# Patient Record
Sex: FEMALE | Race: WHITE | NOT HISPANIC OR LATINO
[De-identification: names, ages, dates, MRNs, and addresses within clinical notes are randomized per-mention and may not be internally consistent; named-entity substitution may affect disease eponyms.]

---

## 2019-01-30 PROBLEM — Z00.00 ENCOUNTER FOR PREVENTIVE HEALTH EXAMINATION: Status: ACTIVE | Noted: 2019-01-30

## 2019-02-11 ENCOUNTER — APPOINTMENT (OUTPATIENT)
Dept: PULMONOLOGY | Facility: CLINIC | Age: 54
End: 2019-02-11
Payer: COMMERCIAL

## 2019-02-11 VITALS — DIASTOLIC BLOOD PRESSURE: 70 MMHG | SYSTOLIC BLOOD PRESSURE: 120 MMHG | HEART RATE: 91 BPM | OXYGEN SATURATION: 98 %

## 2019-02-11 VITALS — RESPIRATION RATE: 16 BRPM

## 2019-02-11 VITALS — HEIGHT: 69.5 IN | WEIGHT: 104 LBS | BODY MASS INDEX: 15.06 KG/M2

## 2019-02-11 VITALS — BODY MASS INDEX: 20.65 KG/M2 | HEIGHT: 59.5 IN

## 2019-02-11 PROCEDURE — 94664 DEMO&/EVAL PT USE INHALER: CPT | Mod: 59

## 2019-02-11 PROCEDURE — 99205 OFFICE O/P NEW HI 60 MIN: CPT | Mod: 25

## 2019-02-11 PROCEDURE — 94060 EVALUATION OF WHEEZING: CPT

## 2019-02-11 NOTE — PROCEDURE
[FreeTextEntry1] : Spirometry shows severe restrictive disease with FEV1 30% predicted and vital capacity 30% predicted. There is no bronchodilator response. In terms of absolute numbers, there is about a 15% drop over what was seen 20 years ago.

## 2019-02-11 NOTE — ASSESSMENT
[FreeTextEntry1] : The patient has long-standing kyphoscoliosis with severe restrictive disease. She is having more shortness of breath. There may be some loss of height here or some mild weight gain. She has signs of obstructive sleep apnea but I'm also concerned that she may have been related hypoventilation.\par \par As far as her lung function goes, there is little that we can do at this time. I have ordered a baseline ABG to see if there is CO2 retention.\par \par A sleep study has been ordered with end-tidal CO2 monitoring. If there is hypoventilation and she may benefit from noninvasive positive pressure ventilation at night with trilogy in the AVAPS AE mode.

## 2019-02-11 NOTE — PHYSICAL EXAM
[Well Groomed] : well groomed [General Appearance - Well Nourished] : well nourished [General Appearance - In No Acute Distress] : no acute distress [Low Lying Soft Palate] : low lying soft palate [Elongated Uvula] : elongated uvula [Micrognathia] : micrognathia [III] : III [Neck Appearance] : the appearance of the neck was normal [Neck Cervical Mass (___cm)] : no neck mass was observed [Jugular Venous Distention Increased] : there was no jugular-venous distention [Thyroid Diffuse Enlargement] : the thyroid was not enlarged [Thyroid Nodule] : there were no palpable thyroid nodules [Heart Rate And Rhythm] : heart rate and rhythm were normal [Heart Sounds] : normal S1 and S2 [Murmurs] : no murmurs present [Respiration, Rhythm And Depth] : normal respiratory rhythm and effort [Exaggerated Use Of Accessory Muscles For Inspiration] : no accessory muscle use [Auscultation Breath Sounds / Voice Sounds] : lungs were clear to auscultation bilaterally [Chest Palpation] : palpation of the chest revealed no abnormalities [Lungs Percussion] : the lungs were normal to percussion [Kyphosis] : kyphosis [Scoliosis] : scoliosis [Abdomen Soft] : soft [Abdomen Tenderness] : non-tender [Abdomen Mass (___ Cm)] : no abdominal mass palpated [Abnormal Walk] : normal gait [Gait - Sufficient For Exercise Testing] : the gait was sufficient for exercise testing [Nail Clubbing] : no clubbing of the fingernails [Cyanosis, Localized] : no localized cyanosis [Petechial Hemorrhages (___cm)] : no petechial hemorrhages [Skin Color & Pigmentation] : normal skin color and pigmentation [Skin Turgor] : normal skin turgor [] : no rash [Deep Tendon Reflexes (DTR)] : deep tendon reflexes were 2+ and symmetric [Sensation] : the sensory exam was normal to light touch and pinprick [No Focal Deficits] : no focal deficits [Oriented To Time, Place, And Person] : oriented to person, place, and time [Impaired Insight] : insight and judgment were intact [Affect] : the affect was normal [FreeTextEntry1] : severe kyphoscoliosis

## 2019-02-18 ENCOUNTER — OUTPATIENT (OUTPATIENT)
Dept: OUTPATIENT SERVICES | Facility: HOSPITAL | Age: 54
LOS: 1 days | End: 2019-02-18
Payer: COMMERCIAL

## 2019-02-18 DIAGNOSIS — J98.4 OTHER DISORDERS OF LUNG: ICD-10-CM

## 2019-02-18 LAB
BASE EXCESS BLDA CALC-SCNC: 3.5 MMOL/L — HIGH (ref -2–2)
BLOOD GAS COMMENTS ARTERIAL: SIGNIFICANT CHANGE UP
GAS PNL BLDA: SIGNIFICANT CHANGE UP
HCO3 BLDA-SCNC: 28 MMOL/L — HIGH (ref 20–26)
HOROWITZ INDEX BLDA+IHG-RTO: 0.21 — SIGNIFICANT CHANGE UP
PCO2 BLDA: 43 MMHG — SIGNIFICANT CHANGE UP (ref 35–45)
PH BLDA: 7.43 — SIGNIFICANT CHANGE UP (ref 7.35–7.45)
PO2 BLDA: 92 MMHG — SIGNIFICANT CHANGE UP (ref 83–108)
SAO2 % BLDA: 98 % — SIGNIFICANT CHANGE UP (ref 95–99)

## 2019-02-18 PROCEDURE — 82803 BLOOD GASES ANY COMBINATION: CPT

## 2019-03-26 ENCOUNTER — APPOINTMENT (OUTPATIENT)
Dept: PULMONOLOGY | Facility: CLINIC | Age: 54
End: 2019-03-26
Payer: COMMERCIAL

## 2019-03-26 VITALS
SYSTOLIC BLOOD PRESSURE: 100 MMHG | WEIGHT: 106 LBS | OXYGEN SATURATION: 94 % | HEART RATE: 86 BPM | BODY MASS INDEX: 21.05 KG/M2 | DIASTOLIC BLOOD PRESSURE: 60 MMHG

## 2019-03-26 VITALS — RESPIRATION RATE: 16 BRPM

## 2019-03-26 PROCEDURE — 99214 OFFICE O/P EST MOD 30 MIN: CPT

## 2019-03-26 NOTE — ASSESSMENT
[FreeTextEntry1] : The patient demonstrates no CO2 retention. She has moderate obstructive sleep apnea. Based upon her body habitus and the results of her blood gas and sleep study, I believe the best course of treatment here would be oral appliance therapy. I have referred her for that and I will see her back for a home sleep study with the appliance in once it is fitted.

## 2019-03-26 NOTE — REASON FOR VISIT
[Follow-Up] : a follow-up visit [Shortness of Breath] : shortness of Breath [Sleep Apnea] : sleep apnea [Spouse] : spouse

## 2019-03-26 NOTE — PHYSICAL EXAM
[Well Groomed] : well groomed [General Appearance - Well Nourished] : well nourished [General Appearance - In No Acute Distress] : no acute distress [FreeTextEntry1] : severe kyphoscoliosis [Low Lying Soft Palate] : low lying soft palate [Elongated Uvula] : elongated uvula [Micrognathia] : micrognathia [III] : III [Neck Appearance] : the appearance of the neck was normal [Neck Cervical Mass (___cm)] : no neck mass was observed [Jugular Venous Distention Increased] : there was no jugular-venous distention [Thyroid Diffuse Enlargement] : the thyroid was not enlarged [Thyroid Nodule] : there were no palpable thyroid nodules [Heart Rate And Rhythm] : heart rate and rhythm were normal [Heart Sounds] : normal S1 and S2 [Murmurs] : no murmurs present [Respiration, Rhythm And Depth] : normal respiratory rhythm and effort [Exaggerated Use Of Accessory Muscles For Inspiration] : no accessory muscle use [Auscultation Breath Sounds / Voice Sounds] : lungs were clear to auscultation bilaterally [Chest Palpation] : palpation of the chest revealed no abnormalities [Lungs Percussion] : the lungs were normal to percussion [Kyphosis] : kyphosis [Scoliosis] : scoliosis [Abdomen Soft] : soft [Abdomen Tenderness] : non-tender [Abdomen Mass (___ Cm)] : no abdominal mass palpated [Abnormal Walk] : normal gait [Gait - Sufficient For Exercise Testing] : the gait was sufficient for exercise testing [Nail Clubbing] : no clubbing of the fingernails [Cyanosis, Localized] : no localized cyanosis [Petechial Hemorrhages (___cm)] : no petechial hemorrhages [Deep Tendon Reflexes (DTR)] : deep tendon reflexes were 2+ and symmetric [Sensation] : the sensory exam was normal to light touch and pinprick [No Focal Deficits] : no focal deficits [Oriented To Time, Place, And Person] : oriented to person, place, and time [Impaired Insight] : insight and judgment were intact [Affect] : the affect was normal [Skin Color & Pigmentation] : normal skin color and pigmentation [Skin Turgor] : normal skin turgor [] : no rash

## 2024-01-29 ENCOUNTER — APPOINTMENT (OUTPATIENT)
Dept: CARDIOLOGY | Facility: CLINIC | Age: 59
End: 2024-01-29
Payer: COMMERCIAL

## 2024-01-29 VITALS
DIASTOLIC BLOOD PRESSURE: 80 MMHG | BODY MASS INDEX: 23.3 KG/M2 | RESPIRATION RATE: 16 BRPM | HEART RATE: 80 BPM | OXYGEN SATURATION: 97 % | HEIGHT: 57 IN | SYSTOLIC BLOOD PRESSURE: 142 MMHG | WEIGHT: 108 LBS

## 2024-01-29 DIAGNOSIS — G47.33 OBSTRUCTIVE SLEEP APNEA (ADULT) (PEDIATRIC): ICD-10-CM

## 2024-01-29 PROCEDURE — 93000 ELECTROCARDIOGRAM COMPLETE: CPT

## 2024-01-29 PROCEDURE — 99204 OFFICE O/P NEW MOD 45 MIN: CPT | Mod: 25

## 2024-01-29 RX ORDER — ISOPROPYL ALCOHOL 700 MG/ML
LIQUID TOPICAL
Refills: 0 | Status: DISCONTINUED | COMMUNITY
End: 2024-01-29

## 2024-01-29 RX ORDER — CHROMIUM 200 MCG
TABLET ORAL
Refills: 0 | Status: ACTIVE | COMMUNITY

## 2024-01-29 RX ORDER — RISEDRONATE SODIUM 150 MG/1
150 TABLET, FILM COATED ORAL
Refills: 0 | Status: ACTIVE | COMMUNITY

## 2024-01-29 RX ORDER — FLUTICASONE PROPIONATE 50 UG/1
50 SPRAY, METERED NASAL
Refills: 0 | Status: DISCONTINUED | COMMUNITY
Start: 2019-03-26 | End: 2024-01-29

## 2024-01-29 NOTE — ASSESSMENT
[FreeTextEntry1] : ECG: Normal sinus rhythm at 80 bpm.  No significant ST or T wave changes.  Impression: 58-year-old female 14 years postmenopausal with 5 months of exertional chest tightness. Longstanding severe kyphoscoliosis and restrictive lung disease which could possibly contribute to some of the symptoms. No obvious risk factors for coronary artery disease.  Plan: 1.  Echocardiography to rule out any significant structural heart disease or pulm hypertension that may contribute to symptoms.  2. An exercise sestamibi stress test looking for evidence of ischemia given the rather convincing story for coronary insufficiency  3.  Pulmonary follow-up given the history of severe kyphoscoliosis and restrictive lung disease which may very well contribute to some of the symptoms.

## 2024-01-29 NOTE — REASON FOR VISIT
[FreeTextEntry1] : 58-year-old female with a history of surgical menopause 14 years ago presents here for cardiac evaluation of exertional chest tightness.  Patient describes approximately 5 months of symptoms, worse in the cold weather that are best described as a tightness that is retrosternal lasting a few minutes occurring exclusively with exertion.  Associated symptoms can occur nausea and diaphoresis. She has some generalized exertional fatigue.  Symptoms have not become any worse but she had a particularly bad episode about 10 days ago while walking in the cold air in [facility].  No pre-existing history of cardiovascular disease Denies hypertension, diabetes, hyperlipidemia tobacco use or family history of premature CAD.  There is some history of valvular disease in the family  Patient has a longstanding history of severe kyphoscoliosis with secondary restrictive lung disease last assessed by pulmonary more than 4 years ago. Patient does have a history of moderate obstructive sleep apnea with an AHI of 18.6

## 2024-01-29 NOTE — REVIEW OF SYSTEMS
[Dyspnea on exertion] : dyspnea during exertion [Chest Discomfort] : chest discomfort [Lower Ext Edema] : no extremity edema [Leg Claudication] : no intermittent leg claudication [Palpitations] : no palpitations [PND] : no PND [Syncope] : no syncope [Joint Pain] : no joint pain [Joint Stiffness] : joint stiffness [Negative] : Heme/Lymph

## 2024-01-29 NOTE — PHYSICAL EXAM
[Clear Lung Fields] : clear lung fields [No Respiratory Distress] : no respiratory distress  [No Edema] : no edema [No Cyanosis] : no cyanosis [No Rash] : no rash [No Skin Lesions] : no skin lesions [Normal] : alert and oriented, normal memory [de-identified] : Severe kyphoscoliosis [de-identified] : Normocephalic atraumatic [de-identified] : Cardiac: Nl S1 S2 with a grade 1/6  FILI and no significant  gallops or rubs. [de-identified] : Right upper extremity is atrophic and has muscle wasting with fixation of the joints.

## 2024-02-05 ENCOUNTER — APPOINTMENT (OUTPATIENT)
Dept: CARDIOLOGY | Facility: CLINIC | Age: 59
End: 2024-02-05
Payer: COMMERCIAL

## 2024-02-05 PROCEDURE — 93306 TTE W/DOPPLER COMPLETE: CPT

## 2024-02-15 ENCOUNTER — NON-APPOINTMENT (OUTPATIENT)
Age: 59
End: 2024-02-15

## 2024-02-15 ENCOUNTER — APPOINTMENT (OUTPATIENT)
Dept: FAMILY MEDICINE | Facility: CLINIC | Age: 59
End: 2024-02-15
Payer: COMMERCIAL

## 2024-02-15 VITALS
DIASTOLIC BLOOD PRESSURE: 73 MMHG | SYSTOLIC BLOOD PRESSURE: 111 MMHG | BODY MASS INDEX: 22.87 KG/M2 | HEART RATE: 96 BPM | WEIGHT: 106 LBS | OXYGEN SATURATION: 97 % | HEIGHT: 57 IN | TEMPERATURE: 97.7 F

## 2024-02-15 PROCEDURE — 99204 OFFICE O/P NEW MOD 45 MIN: CPT

## 2024-02-15 NOTE — HISTORY OF PRESENT ILLNESS
[FreeTextEntry1] : new  [de-identified] :  2 kids  1 gc  tob -    etoh -     stay at [facility]  vit  d   low   osteoporosis    r monthly

## 2024-02-15 NOTE — HEALTH RISK ASSESSMENT
[0] : 2) Feeling down, depressed, or hopeless: Not at all (0) [PHQ-2 Negative - No further assessment needed] : PHQ-2 Negative - No further assessment needed [[id number]] : 0

## 2024-02-15 NOTE — PLAN
[FreeTextEntry1] : 58-year-old female establishes medical care at this facility Accompanied by her , well-developed well-nourished female no acute distress Non-smoker/nondrinker  2 children 1 grandchild She stays at home to care for her grandchild Biliary colic/viral gastroenteritis-seen in the emergency room yesterday for nausea vomiting.  Evidence of gallstones nonobstructive Review of lab work unremarkable Renal cysts-incidental finding of renal cysts noncontributory normal renal function Kyphoscoliosis-childhood condition with secondary hypoventilation syndrome and obstructive sleep apnea Osteoporosis-right spin around once monthly Will return for lab work and evaluation

## 2024-02-20 ENCOUNTER — APPOINTMENT (OUTPATIENT)
Dept: PULMONOLOGY | Facility: CLINIC | Age: 59
End: 2024-02-20
Payer: COMMERCIAL

## 2024-02-20 VITALS
BODY MASS INDEX: 22.87 KG/M2 | RESPIRATION RATE: 16 BRPM | HEART RATE: 81 BPM | SYSTOLIC BLOOD PRESSURE: 140 MMHG | OXYGEN SATURATION: 97 % | WEIGHT: 106 LBS | DIASTOLIC BLOOD PRESSURE: 72 MMHG | HEIGHT: 57 IN

## 2024-02-20 PROCEDURE — G2211 COMPLEX E/M VISIT ADD ON: CPT

## 2024-02-20 PROCEDURE — 99205 OFFICE O/P NEW HI 60 MIN: CPT

## 2024-02-20 NOTE — DISCUSSION/SUMMARY
[FreeTextEntry1] : 58-year-old female seen today for the above.  Patient's complaints of dyspnea may be on the basis of age and worsening effects of her chest wall restriction which were in the severe category 5 years ago.  Her untreated sleep apnea may be contributing to tach arrhythmias during the day and therefore she is to be reevaluated with home sleep study as well as full pulmonary function test.  Further recommendations to follow.  Patient will complete her cardiac workup during this time also

## 2024-02-20 NOTE — PHYSICAL EXAM
[No Acute Distress] : no acute distress [Normal Oropharynx] : normal oropharynx [IV] : Mallampati Class: IV [Normal Appearance] : normal appearance [Neck Circumference: ___] : neck circumference: [unfilled] [No Neck Mass] : no neck mass [Normal Rate/Rhythm] : normal rate/rhythm [Normal S1, S2] : normal s1, s2 [No Murmurs] : no murmurs [No Resp Distress] : no resp distress [Clear to Auscultation Bilaterally] : clear to auscultation bilaterally [No Abnormalities] : no abnormalities [Scoliosis] : scoliosis [Surgical scars] : surgical scars [Benign] : benign [Normal Gait] : normal gait [No Clubbing] : no clubbing [No Cyanosis] : no cyanosis [No Edema] : no edema [FROM] : FROM [Normal Color/ Pigmentation] : normal color/ pigmentation [No Focal Deficits] : no focal deficits [Oriented x3] : oriented x3 [Normal Affect] : normal affect

## 2024-02-20 NOTE — HISTORY OF PRESENT ILLNESS
[Obstructive Sleep Apnea] : obstructive sleep apnea [Awakes Unrefreshed] : awakes unrefreshed [Awakes with Dry Mouth] : awakes with dry mouth [Awakes with Headache] : awakes with headache [Daytime Somnolence] : daytime somnolence [Snoring] : snoring [Never] : never [TextBox_4] : 58-year-old female with a history of severe scoliosis and chest wall restriction with last documented PFTs in this office over 5 years ago of 30% of predicted.  The patient was seen by cardiology and was noted to have worsening fatigue and most recently had increased symptoms with documented tachycardia while walking in the city.  She denies any symptoms of cough, wheeze, sputum, fevers, chills, chest pains.  No history of leg edema, paroxysmal nocturnal dyspnea or orthopnea.  Her sleep history as below but the patient was originally considering an oral appliance which she failed to follow-up with.  Patient was seen for [facility] several weeks ago for nausea vomiting and diarrhea which resolved.  Workup there showed no evidence of chronic hypercapnia on routine blood work or evidence of anemia.  Data reviewed reviewed on HIE [Witnessed Apneas] : no witnessed apneas [Unusual Sleep Behavior] : no unusual sleep behavior [TextBox_77] : 2100 [TextBox_79] : 8801 [TextBox_81] : 10 [TextBox_89] : 2 [TextBox_100] : 2/15/15 [TextBox_108] : 18.4 [TextBox_120] : .5 [ESS] : 0

## 2024-02-20 NOTE — END OF VISIT
[FreeTextEntry3] : Chart review, hospitalization review [Time Spent: ___ minutes] : I have spent [unfilled] minutes of time on the encounter.

## 2024-03-13 ENCOUNTER — OUTPATIENT (OUTPATIENT)
Dept: OUTPATIENT SERVICES | Facility: HOSPITAL | Age: 59
LOS: 1 days | End: 2024-03-13
Payer: COMMERCIAL

## 2024-03-13 DIAGNOSIS — G47.33 OBSTRUCTIVE SLEEP APNEA (ADULT) (PEDIATRIC): ICD-10-CM

## 2024-03-13 PROCEDURE — 95800 SLP STDY UNATTENDED: CPT | Mod: 26

## 2024-03-13 PROCEDURE — 95800 SLP STDY UNATTENDED: CPT

## 2024-03-15 ENCOUNTER — NON-APPOINTMENT (OUTPATIENT)
Age: 59
End: 2024-03-15

## 2024-03-15 ENCOUNTER — APPOINTMENT (OUTPATIENT)
Dept: CARDIOLOGY | Facility: CLINIC | Age: 59
End: 2024-03-15

## 2024-03-29 ENCOUNTER — APPOINTMENT (OUTPATIENT)
Dept: CARDIOLOGY | Facility: CLINIC | Age: 59
End: 2024-03-29

## 2024-03-29 ENCOUNTER — APPOINTMENT (OUTPATIENT)
Dept: FAMILY MEDICINE | Facility: CLINIC | Age: 59
End: 2024-03-29
Payer: COMMERCIAL

## 2024-03-29 VITALS
HEIGHT: 57 IN | OXYGEN SATURATION: 96 % | BODY MASS INDEX: 22.95 KG/M2 | WEIGHT: 106.38 LBS | TEMPERATURE: 97.8 F | DIASTOLIC BLOOD PRESSURE: 72 MMHG | RESPIRATION RATE: 16 BRPM | HEART RATE: 85 BPM | SYSTOLIC BLOOD PRESSURE: 122 MMHG

## 2024-03-29 DIAGNOSIS — J04.10 ACUTE TRACHEITIS W/OUT OBSTRUCTION: ICD-10-CM

## 2024-03-29 PROCEDURE — 99214 OFFICE O/P EST MOD 30 MIN: CPT

## 2024-03-29 NOTE — HEALTH RISK ASSESSMENT
[0] : 2) Feeling down, depressed, or hopeless: Not at all (0) [PHQ-2 Negative - No further assessment needed] : PHQ-2 Negative - No further assessment needed [[id number]] : 0

## 2024-03-29 NOTE — PLAN
[FreeTextEntry1] : 58-year-old female presents for evaluation Tracheitis-she was seen at a walk-in placed on amoxicillin and she got little relief URI symptomatology with shotty nodes positive rhinorrhea prescribed doxycycline and Phenergan DM Scoliosis-restrictive lung disease as secondary to severe scoliosis with exertional dyspnea noted

## 2024-04-24 ENCOUNTER — NON-APPOINTMENT (OUTPATIENT)
Age: 59
End: 2024-04-24

## 2024-04-24 ENCOUNTER — APPOINTMENT (OUTPATIENT)
Dept: CARDIOLOGY | Facility: CLINIC | Age: 59
End: 2024-04-24
Payer: COMMERCIAL

## 2024-04-24 VITALS
SYSTOLIC BLOOD PRESSURE: 116 MMHG | DIASTOLIC BLOOD PRESSURE: 70 MMHG | HEART RATE: 73 BPM | HEIGHT: 57 IN | WEIGHT: 106 LBS | RESPIRATION RATE: 14 BRPM | BODY MASS INDEX: 22.87 KG/M2

## 2024-04-24 DIAGNOSIS — R00.2 PALPITATIONS: ICD-10-CM

## 2024-04-24 DIAGNOSIS — R06.02 SHORTNESS OF BREATH: ICD-10-CM

## 2024-04-24 DIAGNOSIS — R07.89 OTHER CHEST PAIN: ICD-10-CM

## 2024-04-24 PROCEDURE — 99214 OFFICE O/P EST MOD 30 MIN: CPT

## 2024-04-24 PROCEDURE — 93000 ELECTROCARDIOGRAM COMPLETE: CPT

## 2024-04-24 PROCEDURE — G2211 COMPLEX E/M VISIT ADD ON: CPT

## 2024-04-24 RX ORDER — DOXYCYCLINE 100 MG/1
100 CAPSULE ORAL TWICE DAILY
Qty: 20 | Refills: 0 | Status: DISCONTINUED | COMMUNITY
Start: 2024-03-29 | End: 2024-04-24

## 2024-04-24 RX ORDER — PROMETHAZINE HYDROCHLORIDE AND DEXTROMETHORPHAN HYDROBROMIDE ORAL SOLUTION 15; 6.25 MG/5ML; MG/5ML
6.25-15 SOLUTION ORAL
Qty: 1 | Refills: 0 | Status: DISCONTINUED | COMMUNITY
Start: 2024-03-29 | End: 2024-04-24

## 2024-04-24 NOTE — REASON FOR VISIT
[FreeTextEntry1] : RALPH SOUSA is a 58 year-old  F presents here for cardiac follow-up. She was seen for initial cardiac evaluation in January 2024 with concerns regarding exertional chest tightness and palpitations that is worse in the cold weather over 5 months associated with nausea and diaphoresis. Also has exertional fatigue.  She has a hx of severe kyphoscoliosis with secondary restrictive lung disease, moderate MEGHNA.

## 2024-04-24 NOTE — HISTORY OF PRESENT ILLNESS
[FreeTextEntry1] : Patient continues to have symptoms described above with exertion. Does not exercise regularly but does babysit her 3 year old grandson who keeps her active. Goes up and down stairs to do laundry. She continues to have shortness of breath and palpitations with exertion. Denies cough, wheezing, dizziness, pre-syncope or syncope. Denies any edema, orthopnea or PND.   Has moderate sleep apnea, not currently being treated but has an appointment with Dr. De Souza next month.

## 2024-04-24 NOTE — PHYSICAL EXAM
[Well Developed] : well developed [Well Nourished] : well nourished [No Acute Distress] : no acute distress [Normal Conjunctiva] : normal conjunctiva [Normal Venous Pressure] : normal venous pressure [No Carotid Bruit] : no carotid bruit [Normal S1, S2] : normal S1, S2 [No Rub] : no rub [No Gallop] : no gallop [Murmur] : murmur [Clear Lung Fields] : clear lung fields [Good Air Entry] : good air entry [No Respiratory Distress] : no respiratory distress  [Soft] : abdomen soft [Non Tender] : non-tender [No Masses/organomegaly] : no masses/organomegaly [Normal Bowel Sounds] : normal bowel sounds [Normal Gait] : normal gait [No Edema] : no edema [No Cyanosis] : no cyanosis [No Clubbing] : no clubbing [No Varicosities] : no varicosities [Moves all extremities] : moves all extremities [No Focal Deficits] : no focal deficits [Normal Speech] : normal speech [Alert and Oriented] : alert and oriented [Normal memory] : normal memory [de-identified] : I/VI systolic murmur [de-identified] : kyphosis [de-identified] : RUE atrophy

## 2024-04-24 NOTE — ASSESSMENT
[FreeTextEntry1] : ECHOCARDIOGRAM 2/5/24: LVEF 65-70% LA, RA normal in size Normal RV size, thickness and systolic function Mild AI No pericardial effusion  CTA CORONARY 4/11/24: Total Agatston Score:  0 Normal coronary arteries  HOME SLEEP STUDY: 3/2024 ahi 23.7 events/hr Moderate sleep apnea  IMPRESSION:  1. Exertional chest pain, shortness of breath and palpitations of unknown etiology. May be related to restrictive lung disease. Coronary CTA showed normal coronaries making ischemia unlikely. Echocardiogram shows normal LV function and no significant VHD. She has no signs of HF on exam. Cannot rule out arrhythmia as a cause for her symptoms as patient was unable to have exercise nuclear stress testing due to poor veins.   2. Blood pressures are well controlled. Patient with no know hx of HTN.   3. Home sleep study shows moderate sleep apnea.   PLAN:   1. Will obtain 2-week event monitor to evaluate for any ischemia.   2. No changes to her medications at this time.   3. Advised to follow-up with sleep medicine/pulmonary for management of MEGHNA and restrictive lung disease.   Pt knows to call if any new or worsening symptoms. In the absence of any cardiac associated symptoms clinical follow up can be made in 4 months.

## 2024-05-09 ENCOUNTER — APPOINTMENT (OUTPATIENT)
Dept: FAMILY MEDICINE | Facility: CLINIC | Age: 59
End: 2024-05-09
Payer: COMMERCIAL

## 2024-05-09 VITALS
HEIGHT: 57 IN | BODY MASS INDEX: 23.08 KG/M2 | TEMPERATURE: 97.6 F | DIASTOLIC BLOOD PRESSURE: 80 MMHG | WEIGHT: 107 LBS | SYSTOLIC BLOOD PRESSURE: 140 MMHG | RESPIRATION RATE: 16 BRPM | HEART RATE: 93 BPM

## 2024-05-09 DIAGNOSIS — Z13.29 ENCOUNTER FOR SCREENING FOR OTHER SUSPECTED ENDOCRINE DISORDER: ICD-10-CM

## 2024-05-09 DIAGNOSIS — Z13.228 ENCOUNTER FOR SCREENING FOR OTHER SUSPECTED ENDOCRINE DISORDER: ICD-10-CM

## 2024-05-09 DIAGNOSIS — Z13.0 ENCOUNTER FOR SCREENING FOR OTHER SUSPECTED ENDOCRINE DISORDER: ICD-10-CM

## 2024-05-09 PROCEDURE — 99214 OFFICE O/P EST MOD 30 MIN: CPT

## 2024-05-09 NOTE — HEALTH RISK ASSESSMENT
[0] : 2) Feeling down, depressed, or hopeless: Not at all (0) [PHQ-2 Negative - No further assessment needed] : PHQ-2 Negative - No further assessment needed [[id number]] : 0

## 2024-05-09 NOTE — PLAN
[FreeTextEntry1] : 58-year-old female presents accompanied by her , for evaluation Obstructive sleep apnea-partially due to her severe scoliosis.  She is being worked up by pulmonary for the possibility of treatment modalities Nephrocalcinosis-she has a history of nonobstructing renal stones.  Denies significant pain or hematuria.  She is reassured and will monitor Coronary artery disease-had a workup with cardiology and a calcium CT scan was ordered which showed a remarkably normal heart Cholelithiasis-nonobstructing biliary stones asymptomatic Fasting blood work will be ordered and performed at a laboratory

## 2024-05-15 ENCOUNTER — APPOINTMENT (OUTPATIENT)
Dept: PULMONOLOGY | Facility: CLINIC | Age: 59
End: 2024-05-15
Payer: COMMERCIAL

## 2024-05-15 VITALS
WEIGHT: 103 LBS | OXYGEN SATURATION: 95 % | BODY MASS INDEX: 22.22 KG/M2 | HEIGHT: 57 IN | RESPIRATION RATE: 16 BRPM | DIASTOLIC BLOOD PRESSURE: 82 MMHG | HEART RATE: 84 BPM | SYSTOLIC BLOOD PRESSURE: 140 MMHG

## 2024-05-15 VITALS — HEIGHT: 57 IN | WEIGHT: 103 LBS | BODY MASS INDEX: 22.22 KG/M2

## 2024-05-15 DIAGNOSIS — J98.4 OTHER DISORDERS OF LUNG: ICD-10-CM

## 2024-05-15 DIAGNOSIS — G47.33 OBSTRUCTIVE SLEEP APNEA (ADULT) (PEDIATRIC): ICD-10-CM

## 2024-05-15 DIAGNOSIS — R06.02 SHORTNESS OF BREATH: ICD-10-CM

## 2024-05-15 DIAGNOSIS — Z71.89 OTHER SPECIFIED COUNSELING: ICD-10-CM

## 2024-05-15 DIAGNOSIS — M41.9 SCOLIOSIS, UNSPECIFIED: ICD-10-CM

## 2024-05-15 DIAGNOSIS — M41.9 OTHER DISORDERS OF LUNG: ICD-10-CM

## 2024-05-15 PROCEDURE — 94729 DIFFUSING CAPACITY: CPT

## 2024-05-15 PROCEDURE — 94727 GAS DIL/WSHOT DETER LNG VOL: CPT

## 2024-05-15 PROCEDURE — 94010 BREATHING CAPACITY TEST: CPT

## 2024-05-15 PROCEDURE — 99215 OFFICE O/P EST HI 40 MIN: CPT | Mod: 25

## 2024-05-15 PROCEDURE — 85018 HEMOGLOBIN: CPT | Mod: QW

## 2024-05-15 NOTE — END OF VISIT
[Time Spent: ___ minutes] : I have spent [unfilled] minutes of time on the encounter. [FreeTextEntry3] : Chart review, hospitalization review

## 2024-05-15 NOTE — DISCUSSION/SUMMARY
[Obstructive Sleep Apnea] : obstructive sleep apnea [Moderate] : moderate in severity [Alcohol Avoidance] : alcohol avoidance [Sedative Avoidance] : sedative avoidance [de-identified] : CPAP intiated with [employer] device and compliance exlained [FreeTextEntry1] : Severe restrictive lung disease secondary to severe scoliosis and chest wall restriction.  No evidence of airway obstruction or diffusion deficit

## 2024-05-15 NOTE — HISTORY OF PRESENT ILLNESS
[Never] : never [Obstructive Sleep Apnea] : obstructive sleep apnea [Awakes Unrefreshed] : awakes unrefreshed [Awakes with Dry Mouth] : awakes with dry mouth [Awakes with Headache] : awakes with headache [Daytime Somnolence] : daytime somnolence [Snoring] : snoring [TextBox_4] : 58-year-old female with a history of severe scoliosis and chest wall restriction seen today in follow-up.  Patient underwent sleep study which is outlined below as well as follow-up pulmonary function test. No new complaints of increased cough wheeze or sputum [Witnessed Apneas] : no witnessed apneas [Unusual Sleep Behavior] : no unusual sleep behavior [TextBox_77] : 2100 [TextBox_79] : 5671 [TextBox_81] : 10 [TextBox_89] : 2 [TextBox_100] : 3/13/2024 [TextBox_108] : 23.7 [TextBox_112] : 66 minutes [TextBox_116] : 79 [TextBox_120] :  minutes [ESS] : 0

## 2024-05-15 NOTE — PROCEDURE
[FreeTextEntry1] : 5/15/2024: Full pulmonary function test were attempted.  Patient has a severe degree of restriction on spirometry with normal diffusion capacity when corrected for lung volume.

## 2024-06-10 ENCOUNTER — APPOINTMENT (OUTPATIENT)
Dept: FAMILY MEDICINE | Facility: CLINIC | Age: 59
End: 2024-06-10
Payer: COMMERCIAL

## 2024-06-10 VITALS
OXYGEN SATURATION: 96 % | SYSTOLIC BLOOD PRESSURE: 130 MMHG | DIASTOLIC BLOOD PRESSURE: 70 MMHG | WEIGHT: 104 LBS | HEIGHT: 57 IN | RESPIRATION RATE: 16 BRPM | BODY MASS INDEX: 22.44 KG/M2 | TEMPERATURE: 98.4 F | HEART RATE: 74 BPM

## 2024-06-10 DIAGNOSIS — R10.32 LEFT LOWER QUADRANT PAIN: ICD-10-CM

## 2024-06-10 PROCEDURE — 99213 OFFICE O/P EST LOW 20 MIN: CPT

## 2024-06-10 RX ORDER — DICYCLOMINE HYDROCHLORIDE 20 MG/1
20 TABLET ORAL EVERY 6 HOURS
Qty: 80 | Refills: 0 | Status: ACTIVE | COMMUNITY
Start: 2024-06-10 | End: 1900-01-01

## 2024-06-10 NOTE — PHYSICAL EXAM
[LLQ] : in the left lower quadrant [No Mass] : no masses were palpated [Normal] : affect was normal and insight and judgment were intact

## 2024-06-10 NOTE — PLAN
[FreeTextEntry1] : Acute LLQ pain- Concern for colitis and diverticulitis. Discussed with Dr. Ramirez. CBC, CMP and CT abd/pelv ordered. If symptoms are worsening or fever occurs go to ER for evaluation. Bentyl 10mg Q6 hrs prn cramping prescribed.

## 2024-06-10 NOTE — HISTORY OF PRESENT ILLNESS
[FreeTextEntry8] : Patient presents for LLQ abdominal pain, diarrhea, vomiting- just saturday. Diarrhea and slowly improved, had 10 episodes on saturday, has had 4 episodes today. Pain is cramping, intermittent, worst at a 10/10, in office 4/10. Diarrhea today is pink. She denies previous episodes or fevers. She has been taking Imodium with some relief.

## 2024-06-13 LAB
ALBUMIN SERPL ELPH-MCNC: 4.1 G/DL
ALP BLD-CCNC: 66 U/L
ALT SERPL-CCNC: 34 U/L
ANION GAP SERPL CALC-SCNC: 14 MMOL/L
AST SERPL-CCNC: 33 U/L
BASOPHILS # BLD AUTO: 0.05 K/UL
BASOPHILS NFR BLD AUTO: 0.4 %
BILIRUB SERPL-MCNC: 0.3 MG/DL
BUN SERPL-MCNC: 18 MG/DL
CALCIUM SERPL-MCNC: 9.4 MG/DL
CHLORIDE SERPL-SCNC: 99 MMOL/L
CO2 SERPL-SCNC: 26 MMOL/L
CREAT SERPL-MCNC: 0.6 MG/DL
EGFR: 103 ML/MIN/1.73M2
EOSINOPHIL # BLD AUTO: 0.1 K/UL
EOSINOPHIL NFR BLD AUTO: 0.7 %
GLUCOSE SERPL-MCNC: 88 MG/DL
HCT VFR BLD CALC: 43.4 %
HGB BLD-MCNC: 13.1 G/DL
IMM GRANULOCYTES NFR BLD AUTO: 0.4 %
LYMPHOCYTES # BLD AUTO: 2.85 K/UL
LYMPHOCYTES NFR BLD AUTO: 20.3 %
MAN DIFF?: NORMAL
MCHC RBC-ENTMCNC: 29 PG
MCHC RBC-ENTMCNC: 30.2 GM/DL
MCV RBC AUTO: 96 FL
MONOCYTES # BLD AUTO: 0.99 K/UL
MONOCYTES NFR BLD AUTO: 7 %
NEUTROPHILS # BLD AUTO: 10 K/UL
NEUTROPHILS NFR BLD AUTO: 71.2 %
PLATELET # BLD AUTO: 234 K/UL
POTASSIUM SERPL-SCNC: 4.3 MMOL/L
PROT SERPL-MCNC: 7.6 G/DL
RBC # BLD: 4.52 M/UL
RBC # FLD: 13.2 %
SODIUM SERPL-SCNC: 140 MMOL/L
WBC # FLD AUTO: 14.05 K/UL

## 2024-07-30 ENCOUNTER — APPOINTMENT (OUTPATIENT)
Dept: PULMONOLOGY | Facility: CLINIC | Age: 59
End: 2024-07-30
Payer: COMMERCIAL

## 2024-07-30 VITALS
WEIGHT: 106 LBS | SYSTOLIC BLOOD PRESSURE: 125 MMHG | HEIGHT: 57 IN | OXYGEN SATURATION: 94 % | BODY MASS INDEX: 22.87 KG/M2 | DIASTOLIC BLOOD PRESSURE: 71 MMHG | HEART RATE: 80 BPM | RESPIRATION RATE: 16 BRPM

## 2024-07-30 DIAGNOSIS — Z00.00 ENCOUNTER FOR GENERAL ADULT MEDICAL EXAMINATION W/OUT ABNORMAL FINDINGS: ICD-10-CM

## 2024-07-30 DIAGNOSIS — M41.9 SCOLIOSIS, UNSPECIFIED: ICD-10-CM

## 2024-07-30 PROCEDURE — 99214 OFFICE O/P EST MOD 30 MIN: CPT

## 2024-07-30 PROCEDURE — G2211 COMPLEX E/M VISIT ADD ON: CPT | Mod: NC

## 2024-07-30 NOTE — HISTORY OF PRESENT ILLNESS
[Never] : never [Obstructive Sleep Apnea] : obstructive sleep apnea [Awakes Unrefreshed] : awakes unrefreshed [Awakes with Dry Mouth] : awakes with dry mouth [Awakes with Headache] : awakes with headache [Daytime Somnolence] : daytime somnolence [Snoring] : snoring [APAP:] : APAP [Nasal mask] : nasal mask [TextBox_4] : 58-year-old female with a history of severe scoliosis and chest wall restriction seen today in follow-up.  Patient underwent sleep study which is outlined below as well as follow-up pulmonary function test. No new complaints of increased cough wheeze or sputum [Witnessed Apneas] : no witnessed apneas [Unusual Sleep Behavior] : no unusual sleep behavior [TextBox_77] : 2100 [TextBox_79] : 8175 [TextBox_81] : 10 [TextBox_89] : 2 [TextBox_100] : 3/13/2024 [TextBox_108] : 23.7 [TextBox_112] : 66 minutes [TextBox_116] : 79 [TextBox_120] :  minutes [TextBox_125] : 4-16 [TextBox_127] : 6/24/2024 [TextBox_129] : 7/23/2024 [TextBox_133] : 83 [TextBox_137] : 83 [TextBox_141] : 6 [TextBox_143] : 30 [TextBox_147] : 0.2 [TextBox_158] : Scout [TextBox_160] : N30w [TextBox_162] : 6/2024 [ESS] : 13

## 2024-07-30 NOTE — DISCUSSION/SUMMARY
[Obstructive Sleep Apnea] : obstructive sleep apnea [Moderate] : moderate in severity [Alcohol Avoidance] : alcohol avoidance [Sedative Avoidance] : sedative avoidance [Responding to Treatment] : responding to treatment [CPAP] : CPAP [de-identified] : ,comp Patient compliant with CPAP/BiPAP and benefiting from therapy [FreeTextEntry1] : Severe restrictive lung disease secondary to severe scoliosis and chest wall restriction.  No evidence of airway obstruction or diffusion deficit

## 2024-07-30 NOTE — CONSULT LETTER
[Dear  ___] : Dear  [unfilled], [Consult Letter:] : I had the pleasure of evaluating your patient, [unfilled]. [Please see my note below.] : Please see my note below. [Consult Closing:] : Thank you very much for allowing me to participate in the care of this patient.  If you have any questions, please do not hesitate to contact me. [Sincerely,] : Sincerely, [FreeTextEntry3] : Homero De Souza MD FCCP Pulmonary/Critical Care/Sleep Medicine Department of Internal Medicine  [facility]

## 2024-08-08 ENCOUNTER — APPOINTMENT (OUTPATIENT)
Dept: CARDIOLOGY | Facility: CLINIC | Age: 59
End: 2024-08-08

## 2024-08-08 PROCEDURE — 93000 ELECTROCARDIOGRAM COMPLETE: CPT

## 2024-08-08 PROCEDURE — 99214 OFFICE O/P EST MOD 30 MIN: CPT

## 2024-08-08 PROCEDURE — G2211 COMPLEX E/M VISIT ADD ON: CPT | Mod: NC

## 2024-08-08 NOTE — HISTORY OF PRESENT ILLNESS
[FreeTextEntry1] : Continues to have symptoms of dyspnea. Palpitations less frequent and event monitoring was performed Concerns mostly reflected that her Apple Watch was telling her that her heart rate was fast and occasionally irregular.    Does not exercise regularly but does babysit her 3 year old grandson who keeps her active. Goes up and down stairs to do laundry. She continues to have shortness of breath and palpitations with exertion. Denies cough, wheezing, dizziness, pre-syncope or syncope. Denies any edema, orthopnea or PND.   Has moderate sleep apnea, now on CPAP with good effect and compliance.  She does not believe that CPAP has helped her as regards her symptoms of exertional fatigue.

## 2024-08-08 NOTE — PHYSICAL EXAM
[Well Developed] : well developed [Well Nourished] : well nourished [Normal Conjunctiva] : normal conjunctiva [No Acute Distress] : no acute distress [Normal Venous Pressure] : normal venous pressure [No Carotid Bruit] : no carotid bruit [Normal S1, S2] : normal S1, S2 [No Rub] : no rub [No Gallop] : no gallop [Clear Lung Fields] : clear lung fields [Murmur] : murmur [Good Air Entry] : good air entry [No Respiratory Distress] : no respiratory distress  [Soft] : abdomen soft [Non Tender] : non-tender [No Masses/organomegaly] : no masses/organomegaly [Normal Bowel Sounds] : normal bowel sounds [Normal Gait] : normal gait [No Edema] : no edema [No Cyanosis] : no cyanosis [No Clubbing] : no clubbing [No Varicosities] : no varicosities [Moves all extremities] : moves all extremities [No Focal Deficits] : no focal deficits [Normal Speech] : normal speech [Alert and Oriented] : alert and oriented [Normal memory] : normal memory [de-identified] : I/VI systolic murmur [de-identified] : kyphosis [de-identified] : RUE atrophy

## 2024-08-08 NOTE — ASSESSMENT
[FreeTextEntry1] : ECG: Normal sinus rhythm at 76 with no significant ST-T wave changes.  ECHOCARDIOGRAM 2/5/24: LVEF 65-70% LA, RA normal in size Normal RV size, thickness and systolic function Mild AI No pericardial effusion  CTA CORONARY 4/11/24: Total Agatston Score:  0 Normal coronary arteries  HOME SLEEP STUDY: 3/2024 ahi 23.7 events/hr Moderate sleep apnea   Event monitor 5/27-6/9/2024 Heart rate average 83 Heart rate range 56-1 20 Patient events did not correlate with any significant tacky arrhythmia.  IMPRESSION:  1. Exertional chest pain, shortness of breath.  Shortness of breath and exertional fatigability most likely related to restrictive lung disease.  Coronary CTA showed normal coronaries making ischemia unlikely.  Echocardiogram shows normal LV function and no significant VHD. She has no signs of HF on exam.  Event monitoring showed no evidence of any tacky arrhythmia.  2. Blood pressures are well controlled. Patient with no know hx of HTN.   3. Home sleep study shows moderate sleep apnea.  Patient is now being treated effectively.  PLAN:   1.  Reassured the patient that from a cardiovascular perspective there is no evidence of any significant pathology on rather comprehensive testing.  2. No changes to her medications at this time.   3. Advised to follow-up with sleep medicine/pulmonary for management of MEGHNA and restrictive lung disease.   Pt knows to call if any new or worsening symptoms. In the absence of any cardiac associated symptoms clinical follow up can be made in1 yr

## 2024-10-02 ENCOUNTER — APPOINTMENT (OUTPATIENT)
Dept: FAMILY MEDICINE | Facility: CLINIC | Age: 59
End: 2024-10-02
Payer: COMMERCIAL

## 2024-10-02 VITALS
SYSTOLIC BLOOD PRESSURE: 120 MMHG | OXYGEN SATURATION: 97 % | TEMPERATURE: 98.3 F | BODY MASS INDEX: 22.87 KG/M2 | HEART RATE: 94 BPM | WEIGHT: 106 LBS | DIASTOLIC BLOOD PRESSURE: 80 MMHG | HEIGHT: 57 IN | RESPIRATION RATE: 16 BRPM

## 2024-10-02 DIAGNOSIS — R53.82 CHRONIC FATIGUE, UNSPECIFIED: ICD-10-CM

## 2024-10-02 PROCEDURE — 99213 OFFICE O/P EST LOW 20 MIN: CPT

## 2024-10-02 PROCEDURE — 36415 COLL VENOUS BLD VENIPUNCTURE: CPT

## 2024-10-02 NOTE — REVIEW OF SYSTEMS
[Fatigue] : fatigue [Chest Pain] : no chest pain [Palpitations] : no palpitations [Negative] : Respiratory

## 2024-10-02 NOTE — HISTORY OF PRESENT ILLNESS
[FreeTextEntry1] : concerned about increased fatigue  + EBV would like to be checked history of mono in distant past explained her that EBv  1gg will always be positive if she had mono in the past

## 2024-10-03 LAB
EBV EA AB SER IA-ACNC: <5 U/ML
EBV EA AB TITR SER IF: POSITIVE
EBV EA IGG SER QL IA: 498 U/ML
EBV EA IGG SER-ACNC: NEGATIVE
EBV EA IGM SER IA-ACNC: NEGATIVE
EBV PATRN SPEC IB-IMP: NORMAL
EBV VCA IGG SER IA-ACNC: 13 U/ML
EBV VCA IGM SER QL IA: <10 U/ML
EPSTEIN-BARR VIRUS CAPSID ANTIGEN IGG: NEGATIVE
HCT VFR BLD CALC: 38.5 %
HGB BLD-MCNC: 12.4 G/DL
MCHC RBC-ENTMCNC: 29.9 PG
MCHC RBC-ENTMCNC: 32.2 GM/DL
MCV RBC AUTO: 92.8 FL
PLATELET # BLD AUTO: 225 K/UL
RBC # BLD: 4.15 M/UL
RBC # FLD: 12.7 %
TSH SERPL-ACNC: 0.5 UIU/ML
WBC # FLD AUTO: 8.56 K/UL

## 2024-12-30 ENCOUNTER — APPOINTMENT (OUTPATIENT)
Dept: FAMILY MEDICINE | Facility: CLINIC | Age: 59
End: 2024-12-30
Payer: COMMERCIAL

## 2024-12-30 VITALS
HEART RATE: 95 BPM | TEMPERATURE: 98.4 F | RESPIRATION RATE: 16 BRPM | DIASTOLIC BLOOD PRESSURE: 80 MMHG | OXYGEN SATURATION: 97 % | WEIGHT: 107 LBS | SYSTOLIC BLOOD PRESSURE: 130 MMHG | BODY MASS INDEX: 23.08 KG/M2 | HEIGHT: 57 IN

## 2024-12-30 DIAGNOSIS — M41.9 OTHER DISORDERS OF LUNG: ICD-10-CM

## 2024-12-30 DIAGNOSIS — R05.1 ACUTE COUGH: ICD-10-CM

## 2024-12-30 DIAGNOSIS — M41.9 SCOLIOSIS, UNSPECIFIED: ICD-10-CM

## 2024-12-30 DIAGNOSIS — J98.4 OTHER DISORDERS OF LUNG: ICD-10-CM

## 2024-12-30 DIAGNOSIS — J32.9 CHRONIC SINUSITIS, UNSPECIFIED: ICD-10-CM

## 2024-12-30 DIAGNOSIS — R09.81 NASAL CONGESTION: ICD-10-CM

## 2024-12-30 PROCEDURE — 99214 OFFICE O/P EST MOD 30 MIN: CPT

## 2024-12-30 RX ORDER — AMOXICILLIN AND CLAVULANATE POTASSIUM 875; 125 MG/1; MG/1
875-125 TABLET, COATED ORAL
Qty: 14 | Refills: 0 | Status: ACTIVE | COMMUNITY
Start: 2024-12-30 | End: 1900-01-01

## 2024-12-30 RX ORDER — BENZONATATE 200 MG/1
200 CAPSULE ORAL
Qty: 1 | Refills: 0 | Status: ACTIVE | COMMUNITY
Start: 2024-12-30 | End: 1900-01-01

## 2024-12-30 RX ORDER — ALBUTEROL SULFATE 90 UG/1
108 (90 BASE) INHALANT RESPIRATORY (INHALATION)
Qty: 1 | Refills: 2 | Status: ACTIVE | COMMUNITY
Start: 2024-12-30 | End: 1900-01-01

## 2024-12-31 LAB
INFLUENZA A RESULT: NOT DETECTED
INFLUENZA B RESULT: NOT DETECTED
RESP SYN VIRUS RESULT: DETECTED
SARS-COV-2 RESULT: NOT DETECTED

## 2025-04-14 ENCOUNTER — APPOINTMENT (OUTPATIENT)
Dept: FAMILY MEDICINE | Facility: CLINIC | Age: 60
End: 2025-04-14
Payer: COMMERCIAL

## 2025-04-14 VITALS
SYSTOLIC BLOOD PRESSURE: 120 MMHG | BODY MASS INDEX: 23.3 KG/M2 | WEIGHT: 108 LBS | DIASTOLIC BLOOD PRESSURE: 70 MMHG | OXYGEN SATURATION: 93 % | RESPIRATION RATE: 16 BRPM | HEIGHT: 57 IN | HEART RATE: 92 BPM | TEMPERATURE: 98.3 F

## 2025-04-14 DIAGNOSIS — J02.9 ACUTE PHARYNGITIS, UNSPECIFIED: ICD-10-CM

## 2025-04-14 DIAGNOSIS — J32.9 CHRONIC SINUSITIS, UNSPECIFIED: ICD-10-CM

## 2025-04-14 LAB — S PYO AG SPEC QL IA: NEGATIVE

## 2025-04-14 PROCEDURE — 99213 OFFICE O/P EST LOW 20 MIN: CPT

## 2025-04-14 PROCEDURE — 87880 STREP A ASSAY W/OPTIC: CPT | Mod: QW

## 2025-04-14 RX ORDER — PROMETHAZINE HYDROCHLORIDE 6.25 MG/5ML
6.25 SOLUTION ORAL 3 TIMES DAILY
Qty: 1 | Refills: 1 | Status: ACTIVE | COMMUNITY
Start: 2025-04-14 | End: 1900-01-01

## 2025-04-14 RX ORDER — FLUTICASONE PROPIONATE 50 UG/1
50 SPRAY, METERED NASAL TWICE DAILY
Qty: 1 | Refills: 0 | Status: ACTIVE | COMMUNITY
Start: 2025-04-14 | End: 1900-01-01

## 2025-04-14 RX ORDER — BENZONATATE 200 MG/1
200 CAPSULE ORAL
Qty: 1 | Refills: 0 | Status: ACTIVE | COMMUNITY
Start: 2025-04-14 | End: 1900-01-01

## 2025-04-14 RX ORDER — AMOXICILLIN AND CLAVULANATE POTASSIUM 875; 125 MG/1; MG/1
875-125 TABLET, COATED ORAL
Qty: 10 | Refills: 0 | Status: ACTIVE | COMMUNITY
Start: 2025-04-14 | End: 1900-01-01

## 2025-04-15 LAB
INFLUENZA A RESULT: NOT DETECTED
INFLUENZA B RESULT: NOT DETECTED
RESP SYN VIRUS RESULT: NOT DETECTED
SARS-COV-2 RESULT: NOT DETECTED

## 2025-04-25 ENCOUNTER — APPOINTMENT (OUTPATIENT)
Dept: FAMILY MEDICINE | Facility: CLINIC | Age: 60
End: 2025-04-25
Payer: COMMERCIAL

## 2025-04-25 VITALS
SYSTOLIC BLOOD PRESSURE: 118 MMHG | TEMPERATURE: 98.8 F | DIASTOLIC BLOOD PRESSURE: 70 MMHG | BODY MASS INDEX: 23.3 KG/M2 | RESPIRATION RATE: 16 BRPM | HEIGHT: 57 IN | HEART RATE: 93 BPM | WEIGHT: 108 LBS | OXYGEN SATURATION: 95 %

## 2025-04-25 DIAGNOSIS — M41.9 SCOLIOSIS, UNSPECIFIED: ICD-10-CM

## 2025-04-25 DIAGNOSIS — R10.13 EPIGASTRIC PAIN: ICD-10-CM

## 2025-04-25 DIAGNOSIS — Z71.89 OTHER SPECIFIED COUNSELING: ICD-10-CM

## 2025-04-25 DIAGNOSIS — R09.81 NASAL CONGESTION: ICD-10-CM

## 2025-04-25 DIAGNOSIS — M41.9 OTHER DISORDERS OF LUNG: ICD-10-CM

## 2025-04-25 DIAGNOSIS — J98.4 OTHER DISORDERS OF LUNG: ICD-10-CM

## 2025-04-25 DIAGNOSIS — R05.1 ACUTE COUGH: ICD-10-CM

## 2025-04-25 PROCEDURE — 99214 OFFICE O/P EST MOD 30 MIN: CPT

## 2025-04-25 RX ORDER — METHYLPREDNISOLONE 4 MG/1
4 TABLET ORAL
Qty: 1 | Refills: 0 | Status: ACTIVE | COMMUNITY
Start: 2025-04-25 | End: 1900-01-01

## 2025-04-25 RX ORDER — CEFIXIME 400 MG/1
400 CAPSULE ORAL
Qty: 7 | Refills: 0 | Status: ACTIVE | COMMUNITY
Start: 2025-04-25 | End: 1900-01-01

## 2025-04-25 RX ORDER — PREDNISONE 5 MG/1
5 TABLET ORAL
Qty: 1 | Refills: 0 | Status: DISCONTINUED | COMMUNITY
Start: 2025-04-14 | End: 2025-04-25
